# Patient Record
Sex: FEMALE | Employment: PART TIME | URBAN - METROPOLITAN AREA
[De-identification: names, ages, dates, MRNs, and addresses within clinical notes are randomized per-mention and may not be internally consistent; named-entity substitution may affect disease eponyms.]

---

## 2017-10-25 ENCOUNTER — OFFICE VISIT (OUTPATIENT)
Dept: ORTHOPEDIC SURGERY | Facility: CLINIC | Age: 67
End: 2017-10-25

## 2017-10-25 VITALS — HEIGHT: 65 IN | TEMPERATURE: 98 F | BODY MASS INDEX: 24.99 KG/M2 | WEIGHT: 150 LBS

## 2017-10-25 DIAGNOSIS — M25.561 CHRONIC PAIN OF RIGHT KNEE: Primary | ICD-10-CM

## 2017-10-25 DIAGNOSIS — G89.29 CHRONIC PAIN OF RIGHT KNEE: Primary | ICD-10-CM

## 2017-10-25 PROCEDURE — 99203 OFFICE O/P NEW LOW 30 MIN: CPT | Performed by: ORTHOPAEDIC SURGERY

## 2017-10-25 PROCEDURE — 73560 X-RAY EXAM OF KNEE 1 OR 2: CPT | Performed by: ORTHOPAEDIC SURGERY

## 2017-10-25 RX ORDER — LEVOTHYROXINE SODIUM 112 UG/1
TABLET ORAL
COMMUNITY
Start: 2017-09-19

## 2017-10-25 RX ORDER — CELECOXIB 200 MG/1
CAPSULE ORAL 2 TIMES DAILY
COMMUNITY
Start: 2015-06-17

## 2017-10-25 RX ORDER — AMITRIPTYLINE HYDROCHLORIDE 25 MG/1
TABLET, FILM COATED ORAL
COMMUNITY
Start: 2017-10-04

## 2017-10-25 RX ORDER — DESVENLAFAXINE SUCCINATE 50 MG/1
TABLET, EXTENDED RELEASE ORAL DAILY
COMMUNITY
Start: 2015-06-17

## 2017-10-25 RX ORDER — ARIPIPRAZOLE 5 MG/1
TABLET ORAL DAILY
COMMUNITY
Start: 2015-06-17

## 2017-10-25 RX ORDER — CETIRIZINE HYDROCHLORIDE 10 MG/1
TABLET ORAL DAILY
COMMUNITY
Start: 2015-11-06

## 2017-10-25 RX ORDER — ESTROGENS, CONJUGATED 1.25 MG
TABLET ORAL
COMMUNITY
Start: 2017-09-19

## 2017-10-25 RX ORDER — LOSARTAN POTASSIUM 100 MG/1
TABLET ORAL
COMMUNITY
Start: 2015-06-17

## 2017-10-25 RX ORDER — PRAVASTATIN SODIUM 40 MG
TABLET ORAL
COMMUNITY
Start: 2017-08-25

## 2017-11-01 PROBLEM — M25.561 CHRONIC PAIN OF RIGHT KNEE: Status: ACTIVE | Noted: 2017-11-01

## 2017-11-01 PROBLEM — G89.29 CHRONIC PAIN OF RIGHT KNEE: Status: ACTIVE | Noted: 2017-11-01

## 2019-07-23 ENCOUNTER — OFFICE VISIT CONVERTED (OUTPATIENT)
Dept: GASTROENTEROLOGY | Facility: CLINIC | Age: 69
End: 2019-07-23
Attending: PHYSICIAN ASSISTANT

## 2019-07-23 ENCOUNTER — CONVERSION ENCOUNTER (OUTPATIENT)
Dept: OTHER | Facility: HOSPITAL | Age: 69
End: 2019-07-23

## 2019-08-29 ENCOUNTER — HOSPITAL ENCOUNTER (OUTPATIENT)
Dept: GASTROENTEROLOGY | Facility: HOSPITAL | Age: 69
Setting detail: HOSPITAL OUTPATIENT SURGERY
Discharge: HOME | End: 2019-08-29
Attending: INTERNAL MEDICINE

## 2019-08-29 LAB — GLUCOSE BLD-MCNC: 166 MG/DL (ref 65–99)

## 2021-05-15 VITALS
SYSTOLIC BLOOD PRESSURE: 154 MMHG | BODY MASS INDEX: 26.16 KG/M2 | HEIGHT: 65 IN | WEIGHT: 157 LBS | DIASTOLIC BLOOD PRESSURE: 62 MMHG | OXYGEN SATURATION: 100 % | HEART RATE: 74 BPM

## 2024-12-04 ENCOUNTER — TELEPHONE (OUTPATIENT)
Dept: CARDIOLOGY | Facility: CLINIC | Age: 74
End: 2024-12-04

## 2024-12-04 ENCOUNTER — OFFICE VISIT (OUTPATIENT)
Dept: CARDIOLOGY | Facility: CLINIC | Age: 74
End: 2024-12-04
Payer: MEDICARE

## 2024-12-04 VITALS
SYSTOLIC BLOOD PRESSURE: 149 MMHG | WEIGHT: 142 LBS | HEIGHT: 65 IN | DIASTOLIC BLOOD PRESSURE: 57 MMHG | BODY MASS INDEX: 23.66 KG/M2

## 2024-12-04 DIAGNOSIS — Z01.810 PREOP CARDIOVASCULAR EXAM: Primary | ICD-10-CM

## 2024-12-04 DIAGNOSIS — I25.10 CORONARY ARTERY DISEASE INVOLVING NATIVE CORONARY ARTERY OF NATIVE HEART WITHOUT ANGINA PECTORIS: ICD-10-CM

## 2024-12-04 DIAGNOSIS — I10 ESSENTIAL HYPERTENSION: ICD-10-CM

## 2024-12-04 DIAGNOSIS — E78.2 MIXED HYPERLIPIDEMIA: ICD-10-CM

## 2024-12-04 RX ORDER — INSULIN ASPART 100 [IU]/ML
5 INJECTION, SOLUTION INTRAVENOUS; SUBCUTANEOUS
COMMUNITY

## 2024-12-04 RX ORDER — ROSUVASTATIN CALCIUM 5 MG/1
5 TABLET, COATED ORAL DAILY
COMMUNITY

## 2024-12-04 RX ORDER — FUROSEMIDE 20 MG/1
20 TABLET ORAL 2 TIMES DAILY
COMMUNITY

## 2024-12-04 RX ORDER — METOPROLOL TARTRATE 50 MG
50 TABLET ORAL 2 TIMES DAILY
COMMUNITY

## 2024-12-04 RX ORDER — AMLODIPINE BESYLATE 5 MG/1
5 TABLET ORAL DAILY
COMMUNITY

## 2024-12-04 NOTE — TELEPHONE ENCOUNTER
Patient called with fax number for Dr Rivas to send the EKG for her surgery clearance  [phone]

## 2024-12-04 NOTE — PROGRESS NOTES
CARDIOLOGY INITIAL CONSULT       Chief Complaint  Surgery clearance    Subjective            Maria Antonia Kwan presents to [facility] CARDIOLOGY  History of Present Illness  The patient is referred for cardiovascular evaluation prior to laparoscopic gallbladder surgery.  She has known severe three-vessel coronary artery disease with coronary bypass graft x 3 in June of this year in [place].  The patient is a half essential hypertension and mixed hyperlipidemia.  Her last 2D echo was after surgery and showed a normal ejection fraction and no significant valvular disease.  She denies angina or dyspnea her EKG showed normal sinus rhythm with some poor R wave transition in the septal leads.       Past History:    Medical History:History reviewed. No pertinent past medical history.    Surgical History: has no past surgical history on file.     Family History: family history is not on file.     Social History:     Allergies: Sulfa antibiotics    Current Outpatient Medications on File Prior to Visit   Medication Sig    amitriptyline (ELAVIL) 25 MG tablet     amLODIPine (NORVASC) 5 MG tablet Take 1 tablet by mouth Daily.    ARIPiprazole (ABILIFY) 5 MG tablet Take  by mouth Daily.    celecoxib (CELEBREX) 200 MG capsule Take  by mouth 2 (Two) Times a Day.    cetirizine (zyrTEC) 10 MG tablet Take  by mouth Daily.    desvenlafaxine (PRISTIQ) 50 MG 24 hr tablet Take  by mouth Daily.    furosemide (LASIX) 20 MG tablet Take 1 tablet by mouth 2 (Two) Times a Day.    Insulin Aspart (novoLOG) 100 UNIT/ML injection Inject 5 Units under the skin into the appropriate area as directed 3 (Three) to 4 (Four) times daily.    levothyroxine (SYNTHROID, LEVOTHROID) 112 MCG tablet     losartan (COZAAR) 100 MG tablet Take  by mouth.    metoprolol tartrate (LOPRESSOR) 50 MG tablet Take 1 tablet by mouth 2 (Two) Times a Day.    rosuvastatin (CRESTOR) 5 MG tablet Take 1 tablet by mouth Daily.    pravastatin (PRAVACHOL) 40 MG tablet   "(Patient not taking: Reported on 12/4/2024)    PREMARIN 1.25 MG tablet  (Patient not taking: Reported on 12/4/2024)     No current facility-administered medications on file prior to visit.          Review of Systems : All systems were reviewed and negative    Objective     /57 (BP Location: Left arm)   Ht 165.1 cm (65\")   Wt 64.4 kg (142 lb)   BMI 23.63 kg/m²       Physical Exam    Alert,oriented.  Neck: No JVD, no bruits  Heart. Regular, no gallops, no rubs.  Lungs: no rales, no wheezing.  Abdomen: soft, bs+  LE : No edema. Diminished pedal pulses.   Neurologic. No motor deficits  Result Review :     The following data was reviewed by: Luis Santacruz MD on 12/04/2024:               Data reviewed: Cardiology studies              ECG 12 Lead    Date/Time: 12/4/2024 2:08 PM  Performed by: Luis Edwards MD    Authorized by: Lusi Edwards MD  Comparison: not compared with previous ECG   Rhythm: sinus rhythm  Comments: Poor R progression Septal Leads.               Assessment and Plan        Diagnoses and all orders for this visit:    1. Preop cardiovascular exam (Primary)    2. Coronary artery disease involving native coronary artery of native heart without angina pectoris    3. Mixed hyperlipidemia    4. Essential hypertension        The patient  has known three-vessel coronary artery disease with NSTEMI 6/24 but had a complete revascularization in June of this year.  He is cardiac wise asymptomatic.  Her EKG showed sinus rhythm with poor R progression in septal leads.  I would continue with current medical therapy including beta-blockers and statin therapy.  Continue with low-dose aspirin and blood pressure control.  She is at intermediate but reasonable risk for cardiovascular events during surgical procedure.  Avoid hypotension or tachycardia.  Continue with lifestyle modification and heart healthy diet.  Continue regular exercise.  Will reevaluate in 3 months.    I spent 45 minutes caring for Maria Antonia " on this date of service. This time includes time spent by me in the following activities:reviewing tests, obtaining and/or reviewing a separately obtained history, performing a medically appropriate examination and/or evaluation , ordering medications, tests, or procedures, and documenting information in the medical record.    Maria Antonia Kwan  has no history on file for tobacco use. I have educated her on the risk of diseases from using tobacco products such as cancer, COPD and heart disease.     I advised her to quit and she is no longer a tobacco user and has quit.    I spend 5 minutes on counseling the patient.    Follow Up     Return in about 3 months (around 3/4/2025).    Patient was given instructions and counseling regarding her condition or for health maintenance advice. Please see specific information pulled into the AVS if appropriate.

## 2024-12-05 NOTE — TELEPHONE ENCOUNTER
Procedure: Cholecystectomy    Medication Directive:NA    PMH: CAD s/p CAGB, HLD, HTN    Last Seen: 12/04/2024    Per Dr Frank Santacruz's office note on 12/04/2024: She is at intermediate but reasonable risk for cardiovascular events during surgical procedure.

## 2024-12-09 ENCOUNTER — TELEPHONE (OUTPATIENT)
Dept: CARDIOLOGY | Facility: CLINIC | Age: 74
End: 2024-12-09
Payer: COMMERCIAL

## 2024-12-09 NOTE — TELEPHONE ENCOUNTER
Caller: ADRI    Relationship: Provider    Best call back number: [phone]     What form or medical record are you requesting: MOST RECENT PROGRESS NOTE    Who is requesting this form or medical record from you: [facility] OFFICE    How would you like to receive the form or medical records (pick-up, mail, fax): FAX  If fax, what is the fax number: [phone]      Timeframe paperwork needed: ASAP